# Patient Record
Sex: FEMALE | Race: OTHER
[De-identification: names, ages, dates, MRNs, and addresses within clinical notes are randomized per-mention and may not be internally consistent; named-entity substitution may affect disease eponyms.]

---

## 2017-08-16 ENCOUNTER — HOSPITAL ENCOUNTER (OUTPATIENT)
Dept: HOSPITAL 72 - PAN | Age: 72
Discharge: HOME | End: 2017-08-16
Payer: MEDICARE

## 2017-08-16 DIAGNOSIS — K21.9: Primary | ICD-10-CM

## 2017-08-16 DIAGNOSIS — K62.5: ICD-10-CM

## 2017-08-16 DIAGNOSIS — E78.00: ICD-10-CM

## 2017-08-16 DIAGNOSIS — I10: ICD-10-CM

## 2017-08-16 DIAGNOSIS — R97.0: ICD-10-CM

## 2017-08-16 DIAGNOSIS — Z85.3: ICD-10-CM

## 2017-08-16 DIAGNOSIS — Z90.81: ICD-10-CM

## 2017-08-16 PROCEDURE — 99201: CPT

## 2017-08-16 NOTE — GI INITIAL CONSULT NOTE
Ilene Basilio N.PJaqueline 8/16/17 1519:


History of Present Illness


General


Date patient seen:  Aug 16, 2017


Time patient seen:  14:00


Referring physician:  MARICARMEN


Reason for Consultation:  RECTAL BLEED





Present Illness


HPI


72 year old female patient presents today with c/o of rectal bleed with bloody 

stools, fatigue, and 7 lbs weight loss in the past few months.  In addition the 

patient c/o of abdominal bloating.  Patient has CEA elevation 5.6 during last 

draw x 6 months.  Denies any changes in dietary habits.


Home Meds


Reported Medications


Beclomethasone Dipropionate 40MCG Oral Inh (QVAR 40*) 7.3 Gm Aer.w.adap, 1 PUFF 

INH TWICE A DAY, GM 0 Refills


   8/16/17


Risedronate Sodium (ATELVIA) 35 Mg Tablet.dr, 35 MG ORAL ONCE A WEEK, TAB


   8/16/17


Fluticasone Propionate (Flonase Allergy Relief) 9.9 Ml [place].susp, 9.9 ML NS


   8/16/17


Cholecalciferol (Vitamin D3)* (VITAMIN D*) 1,000 Unit Tablet, 5000 UNIT ORAL 

DAILY, #30 TAB


   8/16/17


Metoprolol Succinate* (TOPROL XL*) 100 Mg Tab.er.24h, 100 MG ORAL DAILY, TAB 0 

Refills


   8/16/17


Atorvastatin Calcium* (LIPITOR*) 10 Mg Tablet, 10 MG ORAL BEDTIME, TAB


   8/16/17


Anastrozole* (ARIMIDEX*) 1 Mg Tablet, 1 MG PO DAILY, TAB


   8/16/17


Lorazepam* (LORAZEPAM*) 0.5 Mg Tablet, 0.5 MG ORAL THREE TIMES A DAY, TAB


   8/16/17


[trintellix]   No Conflict Check, 10


   8/16/17


Dexlansoprazole (Dexilant) 60 Mg Cap.bp, 60 MG ORAL DAILY, CAP


   8/16/17


Celecoxib* (CELEBREX*) 200 Mg Capsule, 200 MG ORAL DAILY, CAP


   8/16/17


Med list reviewed/reconciled:  Yes





Patient History


History Provided By:  Patient


PMH Narrative


HTN


GERD


Depression


Elevated Cholesterol


Breast CA





PSHx


Splenectomy x 20 years


Pertinent Family History:  none


Social History:  Denies: alcohol use, drug use, other, smoking





Review of Systems


All Other Systems:  negative except mentioned in HPI





Physical Exam


T 98.3


/65


P 59


95 RA





.8 lbs


Sp02 EP Interpretation:  reviewed


General Appearance:  normal inspection, well appearing, no apparent distress, 

alert


Head:  normocephalic


EENT:  PERRL/EOMI, normal ENT inspection, TMs normal


Neck:  normal inspection, full range of motion, supple


Respiratory:  normal inspection, chest non-tender, lungs clear, normal breath 

sounds


Cardiovascular:  normal rate


Gastrointestinal:  normal inspection, non tender, soft, normal bowel sounds


Rectal:  normal exam, deferred


Genitourinary:  normal inspection, no CVA tenderness


Musculoskeletal:  normal inspection, back normal


Neurologic:  normal inspection, alert, oriented x3, responsive


Psychiatric:  normal inspection, judgement/insight normal, memory normal


Skin:  normal inspection, normal color, no rash, warm/dry, palpation normal, 

well hydrated, normal turgor


Lymphatic:  normal inspection, no adenopathy





GI: Plan


Problems:  


(1) Elevated CEA


(2) HTN (hypertension)


(3) GERD (gastroesophageal reflux disease)


(4) Elevated cholesterol


(5) Breast CA


(6) Colonoscopy planned


(7) Rectal bleed


Plan


EGD/colonoscopy scheduled 8/23/17.


- CLD and TriLy[employer] prep instructions given.


repeat CEA on day of procedure.





Seen with Dr. Albarran.


Thank you for referring this patient, we will follow.





SURJIT ALBARRAN 8/18/17 1027:


History of Present Illness


Present Illness


Home Meds


Reported Medications


Beclomethasone Dipropionate 40MCG Oral Inh (QVAR 40*) 7.3 Gm Aer.w.adap, 1 PUFF 

INH TWICE A DAY, GM 0 Refills


   8/16/17


Risedronate Sodium (ATELVIA) 35 Mg Tablet.dr, 35 MG ORAL ONCE A WEEK, TAB


   8/16/17


Fluticasone Propionate (Flonase Allergy Relief) 9.9 Ml [place].susp, 9.9 ML NS


   8/16/17


Cholecalciferol (Vitamin D3)* (VITAMIN D*) 1,000 Unit Tablet, 5000 UNIT ORAL 

DAILY, #30 TAB


   8/16/17


Metoprolol Succinate* (TOPROL XL*) 100 Mg Tab.er.24h, 100 MG ORAL DAILY, TAB 0 

Refills


   8/16/17


Atorvastatin Calcium* (LIPITOR*) 10 Mg Tablet, 10 MG ORAL BEDTIME, TAB


   8/16/17


Anastrozole* (ARIMIDEX*) 1 Mg Tablet, 1 MG PO DAILY, TAB


   8/16/17


Lorazepam* (LORAZEPAM*) 0.5 Mg Tablet, 0.5 MG ORAL THREE TIMES A DAY, TAB


   8/16/17


[trintellix]   No Conflict Check, 10


   8/16/17


Dexlansoprazole (Dexilant) 60 Mg Cap.bp, 60 MG ORAL DAILY, CAP


   8/16/17


Celecoxib* (CELEBREX*) 200 Mg Capsule, 200 MG ORAL DAILY, CAP


   8/16/17





GI: Plan


Plan


The patient was seen and examined at bedside and all new and available data was 

reviewed in the patients chart. I agree with the above findings, impression 

and plan.  (Patient seen earlier today. Signature stamp does not reflect 

patient encounter time.). -Ilene Muller MD, N.P. Aug 16, 2017 15:19


SURJIT ALBARRAN Aug 18, 2017 10:27

## 2017-08-23 ENCOUNTER — HOSPITAL ENCOUNTER (OUTPATIENT)
Dept: HOSPITAL 72 - GAS | Age: 72
Discharge: HOME | End: 2017-08-23
Payer: MEDICARE

## 2017-08-23 VITALS — DIASTOLIC BLOOD PRESSURE: 78 MMHG | SYSTOLIC BLOOD PRESSURE: 124 MMHG

## 2017-08-23 VITALS — HEIGHT: 60 IN | BODY MASS INDEX: 28.86 KG/M2 | WEIGHT: 147 LBS

## 2017-08-23 VITALS — SYSTOLIC BLOOD PRESSURE: 125 MMHG | DIASTOLIC BLOOD PRESSURE: 78 MMHG

## 2017-08-23 VITALS — SYSTOLIC BLOOD PRESSURE: 150 MMHG | DIASTOLIC BLOOD PRESSURE: 67 MMHG

## 2017-08-23 VITALS — SYSTOLIC BLOOD PRESSURE: 124 MMHG | DIASTOLIC BLOOD PRESSURE: 78 MMHG

## 2017-08-23 VITALS — DIASTOLIC BLOOD PRESSURE: 66 MMHG | SYSTOLIC BLOOD PRESSURE: 128 MMHG

## 2017-08-23 VITALS — SYSTOLIC BLOOD PRESSURE: 143 MMHG | DIASTOLIC BLOOD PRESSURE: 71 MMHG

## 2017-08-23 VITALS — SYSTOLIC BLOOD PRESSURE: 129 MMHG | DIASTOLIC BLOOD PRESSURE: 63 MMHG

## 2017-08-23 VITALS — SYSTOLIC BLOOD PRESSURE: 148 MMHG | DIASTOLIC BLOOD PRESSURE: 73 MMHG

## 2017-08-23 VITALS — SYSTOLIC BLOOD PRESSURE: 141 MMHG | DIASTOLIC BLOOD PRESSURE: 55 MMHG

## 2017-08-23 DIAGNOSIS — D12.4: ICD-10-CM

## 2017-08-23 DIAGNOSIS — D12.0: ICD-10-CM

## 2017-08-23 DIAGNOSIS — D12.3: ICD-10-CM

## 2017-08-23 DIAGNOSIS — R00.1: ICD-10-CM

## 2017-08-23 DIAGNOSIS — Z79.899: ICD-10-CM

## 2017-08-23 DIAGNOSIS — K64.8: ICD-10-CM

## 2017-08-23 DIAGNOSIS — K29.70: ICD-10-CM

## 2017-08-23 DIAGNOSIS — D12.5: ICD-10-CM

## 2017-08-23 DIAGNOSIS — D12.7: ICD-10-CM

## 2017-08-23 DIAGNOSIS — I10: ICD-10-CM

## 2017-08-23 DIAGNOSIS — R97.0: Primary | ICD-10-CM

## 2017-08-23 DIAGNOSIS — E11.9: ICD-10-CM

## 2017-08-23 LAB
ALBUMIN/GLOB SERPL: 1.4 {RATIO} (ref 1–2.7)
ALT SERPL-CCNC: 15 U/L (ref 3–33)
ANION GAP SERPL CALC-SCNC: 10 MMOL/L (ref 5–15)
AST SERPL-CCNC: 19 U/L (ref 5–40)
BASOPHILS NFR BLD AUTO: 1.5 % (ref 0–2)
CALCIUM SERPL-MCNC: 8.4 MG/DL (ref 8.6–10.2)
CHLORIDE SERPL-SCNC: 106 MEQ/L (ref 98–107)
CO2 SERPL-SCNC: 26 MEQ/L (ref 20–30)
CREAT SERPL-MCNC: 0.7 MG/DL (ref 0.5–0.9)
EOSINOPHIL NFR BLD AUTO: 1.9 % (ref 0–3)
ERYTHROCYTE [DISTWIDTH] IN BLOOD BY AUTOMATED COUNT: 11.1 % (ref 11.6–14.8)
GLOBULIN SER-MCNC: 2.4 G/DL
HEMOLYSIS: 5
LYMPHOCYTES NFR BLD AUTO: 44.3 % (ref 20–45)
MCH RBC QN AUTO: 32.4 PG (ref 27–31)
MCHC RBC AUTO-ENTMCNC: 33.1 G/DL (ref 32–36)
MCV RBC AUTO: 98 FL (ref 80–99)
MONOCYTES NFR BLD AUTO: 6.6 % (ref 1–10)
NEUTROPHILS NFR BLD AUTO: 45.7 % (ref 45–75)
PLATELET # BLD: 247 K/UL (ref 150–450)
PMV BLD AUTO: 8.9 FL (ref 6.5–10.1)
POTASSIUM SERPL-SCNC: 3.9 MEQ/L (ref 3.4–4.9)
PROT SERPL-MCNC: 5.9 G/DL (ref 6.6–8.7)
RBC # BLD AUTO: 3.77 M/UL (ref 4.2–5.4)
SODIUM SERPL-SCNC: 142 MEQ/L (ref 135–145)
WBC # BLD AUTO: 9.2 K/UL (ref 4.8–10.8)

## 2017-08-23 PROCEDURE — 93005 ELECTROCARDIOGRAM TRACING: CPT

## 2017-08-23 PROCEDURE — 43239 EGD BIOPSY SINGLE/MULTIPLE: CPT

## 2017-08-23 PROCEDURE — 82378 CARCINOEMBRYONIC ANTIGEN: CPT

## 2017-08-23 PROCEDURE — 45381 COLONOSCOPY SUBMUCOUS NJX: CPT

## 2017-08-23 PROCEDURE — 94003 VENT MGMT INPAT SUBQ DAY: CPT

## 2017-08-23 PROCEDURE — 94150 VITAL CAPACITY TEST: CPT

## 2017-08-23 PROCEDURE — 36415 COLL VENOUS BLD VENIPUNCTURE: CPT

## 2017-08-23 PROCEDURE — 85025 COMPLETE CBC W/AUTO DIFF WBC: CPT

## 2017-08-23 PROCEDURE — 45385 COLONOSCOPY W/LESION REMOVAL: CPT

## 2017-08-23 PROCEDURE — 80053 COMPREHEN METABOLIC PANEL: CPT

## 2017-08-23 PROCEDURE — 45380 COLONOSCOPY AND BIOPSY: CPT

## 2017-08-23 NOTE — 48 HOUR POST ANESTHESIA EVAL
Post Anesthesia Evaluation


Procedure:  egd/colonoscopy


Date of Evaluation:  Aug 23, 2017


Time of Evaluation:  13:46


Blood Pressure Systolic:  125


0:  78


Pulse Rate:  56


Respiratory Rate:  18


Temperature (Fahrenheit):  97.2


O2 Sat by Pulse Oximetry:  100


Airway:  patent


Nausea:  No


Vomiting:  No


Pain Intensity:  0


Hydration Status:  adequate


Cardiopulmonary Status:


stable


Mental Status/LOC:  patient returned to baseline


Post-Anesthesia Complications:


none


Follow-up care needed:  N/A











COLETTE DAI Aug 23, 2017 13:47

## 2017-08-23 NOTE — IMMEDIATE POST-OP EVALUATION
Immediate Post-Op Evalulation


Immediate Post-Op Evalulation


Procedure:  egd/colonoscopy


Date of Evaluation:  Aug 23, 2017


Time of Evaluation:  12:47


IV Fluids:  0.9ns 650ml


Blood Products:  none


Estimated Blood Loss:  negligible


Blood Pressure Systolic:  124


Blood Pressure Diastolic:  78


Pulse Rate:  57


Respiratory Rate:  18


O2 Sat by Pulse Oximetry:  100


Temperature (Fahrenheit):  97.2


Pain Score (1-10):  0


Nausea:  No


Vomiting:  No


Complications


none


Patient Status:  awake, reacts, patent


Hydration Status:  adequate


Drug:  COLETTE Dumont Aug 23, 2017 13:46

## 2017-08-23 NOTE — ENDOSCOPY PROCEDURE NOTE
Endoscopy Procedure Note


Indication for Procedure:  elevated CEA


Procedures Performed:  EGD, colonoscopy


Operative Findings/Diagnosis:  gastritis, hemorrhoids


Specimen:  yes


Pt Tolerated Procedure Well:  Yes


Estimated Blood Loss:  none


Anesthesiologist:  catie


Anesthesia:  MAC


Implant(s) used?:  No


50 yrs or older w/o bx or poly:  No


10yrs. F/U not recommended:  Yes


If not recommended, why?:  Above average risk


10 yrs. F/U needed:  Yes


18 years or older w/prev. colo:  No











SURJIT CABA Aug 23, 2017 11:33

## 2017-08-23 NOTE — SHORT STAY SURGERY H&P
History of Present Illness


History of Present Illness


Chief Complaint


see recent consult note


HPI


Jagjit Lovell is a 72 year old female who was admitted on  for Abnormal Cea





Patient History


Allergies:  


Coded Allergies:  


     No Known Allergies (Unverified , 8/22/17)


PAST MEDICAL HISTORY:  


Past Surgeries:  


Social History:  





Medication History


Scheduled


Anastrozole* (Arimidex*), 1 MG PO DAILY, (Reported)


Atorvastatin Calcium* (Lipitor*), 10 MG ORAL BEDTIME, (Reported)


Celecoxib* (Celebrex*), 200 MG ORAL DAILY, (Reported)


Cholecalciferol (Vitamin D3)* (Vitamin D*), 5,000 UNIT ORAL DAILY, (Reported)


Metoprolol Succinate* (Toprol Xl*), 100 MG ORAL DAILY, (Reported)





Discontinued Medications


Beclomethasone Dipropionate 40MCG Oral Inh (Qvar 40*), 1 PUFF INH TWICE A DAY, (

Reported)


   Discontinued Reason: Pt stopped taking med


Dexlansoprazole (Dexilant), 60 MG ORAL DAILY, (Reported)


   Discontinued Reason: Pt stopped taking med


Fluticasone Propionate (Flonase Allergy Relief), 9.9 ML NS, (Reported)


   Discontinued Reason: Pt stopped taking med


Lorazepam* (Lorazepam*), 0.5 MG ORAL THREE TIMES A DAY, (Reported)


   Discontinued Reason: Pt stopped taking med


Risedronate Sodium (Atelvia), 35 MG ORAL ONCE A WEEK, (Reported)


   Discontinued Reason: Pt stopped taking med


[trintellix], 10, (Reported)


   Discontinued Reason: Pt stopped taking med





Physical Exam


Vital Signs





Last Vital Signs








  Date Time  Temp Pulse Resp B/P (MAP) Pulse Ox O2 Delivery O2 Flow Rate FiO2


 


8/23/17 09:55 98.4 62 18 143/71 98 Room Air  











Plan


Attestation


Are the patient's medical conditions optimized for surgery?











SURJIT CABA Aug 23, 2017 10:20

## 2017-08-23 NOTE — PRE-PROCEDURE NOTE/ATTESTATION
Pre-Procedure Note/Attestation


Complete Prior to Procedure


Planned Procedure:  not applicable


Procedure Narrative:


esophagogastroduodenoscopy and colonoscopy





Indications for Procedure


Pre-Operative Diagnosis:


elevated CEA, screening colon





Attestation


I attest that I discussed the nature of the procedure; its benefits; risks and 

complications; and alternatives (and the risks and benefits of such alternatives

), prior to the procedure, with the patient (or the patient's legal 

representative).





I attest that, if there was a reasonable possibility of needing a blood 

transfusion, the patient (or the patient's legal representative) was given the 

[facility] standardized written summary, pursuant 

to the [place] Blood Safety Act ([place] Health and Safety Code # 1645, as 

amended).





I attest that I re-evaluated the patient just prior to the surgery and that 

there has been no change in the patient's H&P, except as documented below:











SURJIT CABA Aug 23, 2017 10:20

## 2017-08-23 NOTE — ANETHESIA PREOPERATIVE EVAL
Anesthesia Pre-op PMH/ROS


General


Date of Evaluation:  Aug 23, 2017


Time of Evaluation:  06:01


Anesthesiologist:  milla


ASA Score:  ASA 3


Mallampati Score


Class I : Soft palate, uvula, fauces, pillars visible


Class II: Soft palate, uvula, fauces visible


Class III: Soft palate, base of uvula visible


Class IV: Only hard plate visible


Mallampati Classification:  Class II


Surgeon:  delisa


Diagnosis:  abnormal CEA


Surgical Procedure:  egd/colonoscopy


Anesthesia History:  none


Family History:  no anesthesia problems


Allergies:  


Coded Allergies:  


     No Known Allergies (Unverified , 8/22/17)


Medications:  see eMAR





Past Medical History


Cardiovascular:  Reports: HTN


Endocrine:  Reports: DM





Anesthesia Pre-op Phys. Exam


Physician Exam





Last Vital Signs








  Date Time  Temp Pulse Resp B/P (MAP) Pulse Ox O2 Delivery O2 Flow Rate FiO2


 


8/23/17 09:55 98.4 62 18 143/71 98 Room Air  








Constitutional:  NAD


Neurologic:  CN 2-12 intact


Cardiovascular:  RRR


Respiratory:  CTA


Gastrointestinal:  S/NT/ND





Airway Exam


Mallampati Score:  Class II


MO:  full


Neck:  supple


TMD:  2fb


ROM:  full


Teeth:  missing





Anesthesia Pre-op A/P


Studies


Pre-op Studies:  EKG - sinus bradycardia, nssttw abnl





Risk Assessment & Plan


Assessment:


asa3


Plan:


mac


Status Change Before Surgery:  No





Pre-Antibiotics


Drug:  COLETTE Dumont Aug 23, 2017 06:02

## 2017-08-24 NOTE — PROCEDURE NOTE
DATE OF PROCEDURE:  08/23/2017



SURGEON:  Atilio Albarran M.D.



PROCEDURE:  Upper endoscopy with biopsy and colonoscopy with biopsy

and snare polypectomy.



ANESTHESIOLOGIST:  Karely Liao M.D.



INSTRUMENT:  Olympus adult flexible upper endoscope and

colonoscope.



INDICATION:  Elevated CEA.



REASON FOR PROCEDURE:  The procedure, risks, benefits, and possible

consequences, including hemorrhage, aspiration, perforation and infection,

and alternative treatments, were explained to the patient/legal guardian

by Dr. Atilio Albarran and the patient/legal guardian understood and

accepted these risks.



DESCRIPTION OF PROCEDURE:  After informed consent was obtained and

the patient was adequately sedated, Olympus upper endoscope was advanced

from mouth into the second portion of duodenum and retroflexion was

performed in the stomach.



The patient had diffuse gastritis.  Random biopsy from antrum of the

stomach was obtained to rule out H. pylori infection.  Otherwise, the rest

of the upper endoscopic examination grossly within normal limits.  At this

time, the upper endoscope was retrieved and the patient was turned over

for colonoscopy.



First, a rectal exam was performed, which was positive for internal

hemorrhoids.  Then, the scope was advanced from the rectum into the cecum,

documented by appendiceal orifice, ileocecal valve, and right upper

quadrant palpation.  Quality of prep was adequate.



This was a very challenging and prolonged procedure.  The patient had 15

polyps that we took out and I am pretty sure there are more polyps in this

colonoscopy examination.  We tried to take the big ones out.  The patient

had one in the cecum, which was removed with the cold biopsy forceps

technique, two in the ascending colon, 7 in the transverse colon, two in

the descending colon, two in the sigmoid colon, and one very large one in

the rectosigmoid area.  Most of these polyps were large, some of them were

over 1 cm, and we had to actually cut the polyps in half or even in thirds

to be able to be pass them through the scope.  Then, there is a very large

area of the leaking polyp in the rectosigmoid area about 20 cm from the

anal verge.  This polyp was over 3 to 4 cm in length and very villous

looking, difficult to completely resect one,   _____ polyp.  Given this

procedure was already significantly prolonged, we decided to remove some

of the pieces of this polyp.  We also tattooed the most distal portion of

this polyp with a tattoo for future references.  Retroflexion of rectum

showed evidence of internal hemorrhoids.



SUMMARY OF FINDINGS:  Numerous colonic polyps, see above for

details.



PLAN:  Followup pathology.  _____ or adenocarcinoma in situ to it or

any high-grade dysplasia, we will recommend repeat colonoscopy or

resection.  If there is any evidence of cancer in this polyp, I will

closely recommend resection.  I had a long discussion with the daughter of

the patient after the procedure, given them option.  We are waiting for

the biopsy to come back.  Again if the biopsies were consistent with

cancer, eventually the patient needs surgery.  Otherwise, options would be

surgery for complete resection versus multiple colonoscopies with

piecemeal removal of this polyp.



I want to thank, Dr. Atilio Stone, for this kind referral.









  ______________________________________________

  Atilio Albarran M.D.





DR:  STEFANO

D:  08/23/2017 13:42

T:  08/24/2017 00:28

JOB#:  [id number]

CC:  Atilio Stone M.D.; Fax#:  [phone]

## 2017-08-24 NOTE — CARDIOLOGY REPORT
--------------- APPROVED REPORT --------------





EKG Measurement

Heart [id number]

IN [id number]

[id number]

[id number]

[id number]





Sinus bradycardia

Moderate voltage criteria for LVH, may be normal variant

Nonspecific ST and T wave abnormality

Abnormal ECG

## 2019-01-14 ENCOUNTER — HOSPITAL ENCOUNTER (OUTPATIENT)
Dept: HOSPITAL 72 - PAN | Age: 74
Discharge: HOME | End: 2019-01-14
Payer: MEDICARE

## 2019-01-14 DIAGNOSIS — Z85.3: ICD-10-CM

## 2019-01-14 DIAGNOSIS — R10.9: Primary | ICD-10-CM

## 2019-01-14 DIAGNOSIS — R63.4: ICD-10-CM

## 2019-01-14 DIAGNOSIS — R97.0: ICD-10-CM

## 2019-01-14 DIAGNOSIS — I10: ICD-10-CM

## 2019-01-14 DIAGNOSIS — F32.9: ICD-10-CM

## 2019-01-14 DIAGNOSIS — K21.9: ICD-10-CM

## 2019-01-14 DIAGNOSIS — D12.6: ICD-10-CM

## 2019-01-14 PROCEDURE — 99212 OFFICE O/P EST SF 10 MIN: CPT

## 2019-01-15 VITALS — DIASTOLIC BLOOD PRESSURE: 61 MMHG | SYSTOLIC BLOOD PRESSURE: 108 MMHG

## 2019-01-15 NOTE — GI INITIAL CONSULT NOTE
History of Present Illness


General


Date patient seen:  Kristofer 15, 2019


Time patient seen:  15:23


Referring physician:  MARICARMEN


Reason for Consultation:  Abdominal pain





Present Illness


HPI


73-year-old patient who presents today with left-sided abdominal pain complaint 

of weight loss.  The patient had approximate weight loss of 15 pounds within 

the past 6 months.  The patient had a EGD colonoscopy performed back in 2017, 

noted with numerous amounts of colonic polyps.  Pathology reported sessile 

serrated adenoma, bilious adenoma with focal high-grade dysplasia throughout 

the colon.   No changes in dietary habits.  No signs of abuse or neglect.  

Patient is not fall risk.


Home Meds


Reported Medications


Cholecalciferol (Vitamin D3)* (VITAMIN D*) 1,000 Unit Tablet, 5000 UNIT ORAL 

DAILY, #30 TAB


   8/16/17


Metoprolol Succinate* (TOPROL XL*) 100 Mg Tab.er.24h, 100 MG ORAL DAILY, TAB 0 

Refills


   8/16/17


Atorvastatin Calcium* (LIPITOR*) 10 Mg Tablet, 10 MG ORAL BEDTIME, TAB


   8/16/17


Anastrozole* (ARIMIDEX*) 1 Mg Tablet, 1 MG PO DAILY, TAB


   8/16/17


Celecoxib* (CELEBREX*) 200 Mg Capsule, 200 MG ORAL DAILY, CAP


   8/16/17


Med list reviewed/reconciled:  Yes


Allergies:  


Coded Allergies:  


     No Known Allergies (Unverified , 8/22/17)





Patient History


History Provided By:  Patient, Medical Record


PMH Narrative


HTN


GERD


Depression


Elevated Cholesterol


Breast CA





PSHx


Splenectomy x 20 years


Pertinent Family History:  none


Social History:  Denies: smoking, alcohol use, drug use, other





Review of Systems


All Other Systems:  negative except mentioned in HPI





Physical Exam





Vital Signs








  Date Time  Temp Pulse Resp B/P (MAP) Pulse Ox O2 Delivery O2 Flow Rate FiO2


 


1/15/19 13:32 98.2 68  108/61 97   








Sp02 EP Interpretation:  reviewed, normal


General Appearance:  well appearing, no apparent distress, alert


Head:  normocephalic


EENT:  PERRL/EOMI, normal ENT inspection


Neck:  supple


Respiratory:  normal breath sounds, no respiratory distress


Cardiovascular:  normal rate


Gastrointestinal:  normal inspection, non tender, soft, normal bowel sounds, non

-distended


Rectal:  deferred


Genitourinary:  no CVA tenderness


Musculoskeletal:  normal inspection, back normal


Neurologic:  normal inspection, alert, oriented x3, responsive


Psychiatric:  normal inspection, judgement/insight normal, memory normal


Skin:  normal inspection, normal color, no rash, warm/dry, palpation normal, 

well hydrated


Lymphatic:  normal inspection, no adenopathy





GI: Plan


Problems:  


(1) Weight loss


(2) Colonoscopy planned


(3) Elevated CEA


(4) GERD (gastroesophageal reflux disease)


Plan


EGD/colonoscopy to be scheduled January 16, 2019.


- CLD & (Nulytely/Suprep/Movi-Prep) prep instructions given and acknowledged by 

patient.


- NPO @ MN day prior procedure explained.


We will also draw labs and perform a pan CT procedure day


Will follow with additional recs post procedure.





Seen with Dr. Albarran.


Thank you for this patient referral.





The patient was seen and examined at bedside and all new and available data was 

reviewed in the patients chart. I agree with the above findings, impression 

and plan.  (Patient seen earlier today. Signature stamp does not reflect 

patient encounter time.). - MD Chetna GomesPhoenix Children's HospitalWilberto NP Kristofer 15, 2019 15:29

## 2019-01-16 ENCOUNTER — HOSPITAL ENCOUNTER (OUTPATIENT)
Dept: HOSPITAL 72 - GAS | Age: 74
Discharge: HOME | End: 2019-01-16
Payer: MEDICARE

## 2019-01-16 VITALS — SYSTOLIC BLOOD PRESSURE: 126 MMHG | DIASTOLIC BLOOD PRESSURE: 61 MMHG

## 2019-01-16 VITALS — HEIGHT: 60 IN | BODY MASS INDEX: 25.32 KG/M2 | WEIGHT: 129 LBS

## 2019-01-16 VITALS — SYSTOLIC BLOOD PRESSURE: 117 MMHG | DIASTOLIC BLOOD PRESSURE: 70 MMHG

## 2019-01-16 VITALS — DIASTOLIC BLOOD PRESSURE: 58 MMHG | SYSTOLIC BLOOD PRESSURE: 116 MMHG

## 2019-01-16 VITALS — DIASTOLIC BLOOD PRESSURE: 65 MMHG | SYSTOLIC BLOOD PRESSURE: 115 MMHG

## 2019-01-16 VITALS — SYSTOLIC BLOOD PRESSURE: 117 MMHG | DIASTOLIC BLOOD PRESSURE: 60 MMHG

## 2019-01-16 VITALS — SYSTOLIC BLOOD PRESSURE: 115 MMHG | DIASTOLIC BLOOD PRESSURE: 59 MMHG

## 2019-01-16 VITALS — DIASTOLIC BLOOD PRESSURE: 56 MMHG | SYSTOLIC BLOOD PRESSURE: 115 MMHG

## 2019-01-16 VITALS — SYSTOLIC BLOOD PRESSURE: 141 MMHG | DIASTOLIC BLOOD PRESSURE: 57 MMHG

## 2019-01-16 VITALS — DIASTOLIC BLOOD PRESSURE: 59 MMHG | SYSTOLIC BLOOD PRESSURE: 126 MMHG

## 2019-01-16 DIAGNOSIS — R63.4: ICD-10-CM

## 2019-01-16 DIAGNOSIS — F32.9: ICD-10-CM

## 2019-01-16 DIAGNOSIS — D12.3: ICD-10-CM

## 2019-01-16 DIAGNOSIS — E78.5: ICD-10-CM

## 2019-01-16 DIAGNOSIS — D12.7: ICD-10-CM

## 2019-01-16 DIAGNOSIS — K64.8: ICD-10-CM

## 2019-01-16 DIAGNOSIS — I10: ICD-10-CM

## 2019-01-16 DIAGNOSIS — Z86.010: ICD-10-CM

## 2019-01-16 DIAGNOSIS — Z85.3: ICD-10-CM

## 2019-01-16 DIAGNOSIS — G47.33: ICD-10-CM

## 2019-01-16 DIAGNOSIS — K29.50: Primary | ICD-10-CM

## 2019-01-16 DIAGNOSIS — J44.9: ICD-10-CM

## 2019-01-16 DIAGNOSIS — Z90.81: ICD-10-CM

## 2019-01-16 DIAGNOSIS — K63.5: ICD-10-CM

## 2019-01-16 DIAGNOSIS — K57.30: ICD-10-CM

## 2019-01-16 DIAGNOSIS — M19.90: ICD-10-CM

## 2019-01-16 DIAGNOSIS — F41.9: ICD-10-CM

## 2019-01-16 DIAGNOSIS — K21.9: ICD-10-CM

## 2019-01-16 LAB
ADD MANUAL DIFF: NO
ALBUMIN SERPL-MCNC: 2.3 G/DL (ref 3.4–5)
ALBUMIN/GLOB SERPL: 0.5 {RATIO} (ref 1–2.7)
ALP SERPL-CCNC: 61 U/L (ref 46–116)
ALT SERPL-CCNC: 35 U/L (ref 12–78)
ANION GAP SERPL CALC-SCNC: 12 MMOL/L (ref 5–15)
AST SERPL-CCNC: 21 U/L (ref 15–37)
BASOPHILS NFR BLD AUTO: 0.7 % (ref 0–2)
BILIRUB SERPL-MCNC: 0.7 MG/DL (ref 0.2–1)
BUN SERPL-MCNC: 20 MG/DL (ref 7–18)
CALCIUM SERPL-MCNC: 9.3 MG/DL (ref 8.5–10.1)
CHLORIDE SERPL-SCNC: 107 MMOL/L (ref 98–107)
CO2 SERPL-SCNC: 23 MMOL/L (ref 21–32)
CREAT SERPL-MCNC: 1.2 MG/DL (ref 0.55–1.3)
EOSINOPHIL NFR BLD AUTO: 0.4 % (ref 0–3)
ERYTHROCYTE [DISTWIDTH] IN BLOOD BY AUTOMATED COUNT: 12.4 % (ref 11.6–14.8)
GLOBULIN SER-MCNC: 4.8 G/DL
HCT VFR BLD CALC: 29.4 % (ref 37–47)
HGB BLD-MCNC: 9.4 G/DL (ref 12–16)
LYMPHOCYTES NFR BLD AUTO: 31.2 % (ref 20–45)
MCV RBC AUTO: 91 FL (ref 80–99)
MONOCYTES NFR BLD AUTO: 10.6 % (ref 1–10)
NEUTROPHILS NFR BLD AUTO: 57.1 % (ref 45–75)
PLATELET # BLD: 402 K/UL (ref 150–450)
POTASSIUM SERPL-SCNC: 3.8 MMOL/L (ref 3.5–5.1)
RBC # BLD AUTO: 3.22 M/UL (ref 4.2–5.4)
SODIUM SERPL-SCNC: 142 MMOL/L (ref 136–145)
WBC # BLD AUTO: 11.9 K/UL (ref 4.8–10.8)

## 2019-01-16 PROCEDURE — 94150 VITAL CAPACITY TEST: CPT

## 2019-01-16 PROCEDURE — 45380 COLONOSCOPY AND BIOPSY: CPT

## 2019-01-16 PROCEDURE — 45385 COLONOSCOPY W/LESION REMOVAL: CPT

## 2019-01-16 PROCEDURE — 85025 COMPLETE CBC W/AUTO DIFF WBC: CPT

## 2019-01-16 PROCEDURE — 94003 VENT MGMT INPAT SUBQ DAY: CPT

## 2019-01-16 PROCEDURE — 82378 CARCINOEMBRYONIC ANTIGEN: CPT

## 2019-01-16 PROCEDURE — 36415 COLL VENOUS BLD VENIPUNCTURE: CPT

## 2019-01-16 PROCEDURE — 80053 COMPREHEN METABOLIC PANEL: CPT

## 2019-01-16 PROCEDURE — 93005 ELECTROCARDIOGRAM TRACING: CPT

## 2019-01-16 PROCEDURE — 43239 EGD BIOPSY SINGLE/MULTIPLE: CPT

## 2019-01-16 PROCEDURE — 45381 COLONOSCOPY SUBMUCOUS NJX: CPT

## 2019-01-16 NOTE — ANETHESIA PREOPERATIVE EVAL
Anesthesia Pre-op PMH/ROS


General


Date of Evaluation:  Jan 16, 2019


Time of Evaluation:  10:50


Anesthesiologist:  John


ASA Score:  ASA 2


Mallampati Score


Class I : Soft palate, uvula, fauces, pillars visible


Class II: Soft palate, uvula, fauces visible


Class III: Soft palate, base of uvula visible


Class IV: Only hard plate visible


Mallampati Classification:  Class II


Surgeon:  Avis


Diagnosis:  colon polyps


Surgical Procedure:  EGD/Colonoscopy


Anesthesia History:  none


Family History:  no anesthesia problems


Allergies:  


Coded Allergies:  


     No Known Allergies (Unverified , 1/16/19)


Medications:  see eMAR


Patient NPO?:  Yes


NPO Date:  Kristofer 15, 2019


NPO Time:  21:00





Past Medical History


Cardiovascular:  Reports: HTN, other - high lipids


Pulmonary:  Reports: asthma, COPD, JA, other


Gastrointestinal/Genitourinary:  Reports: GERD, other - splenoctomy 1998


Neurologic/Psychiatric:  Reports: depression/anxiety


Endocrine:  Denies: DM, hypothyroidism, steroids, other


HEENT:  Reports: cataract (L), cataract (R)


Hematology/Immune:  Reports: other - right breast CA - lumpectomy done


Musculoskeletal/Integumentary:  Reports: OA, DJD





Anesthesia Pre-op Phys. Exam


Physician Exam





Last Vital Signs








  Date Time  Temp Pulse Resp B/P (MAP) Pulse Ox O2 Delivery O2 Flow Rate FiO2


 


1/16/19 10:40 97.8 18 18 126/59 97 Room Air  








Constitutional:  NAD


Neurologic:  CN 2-12 intact


Cardiovascular:  RRR


Respiratory:  CTA


Gastrointestinal:  S/NT/ND





Airway Exam


Mallampati Score:  Class II


MO:  full


ROM:  full


Teeth:  intact


Dentures:  no upper, no lower





Anesthesia Pre-op A/P


Labs





Hematology








Test


  1/16/19


10:40


 


White Blood Count


  11.9 K/UL


(4.8-10.8)  H


 


Red Blood Count


  3.22 M/UL


(4.20-5.40)  L


 


Hemoglobin


  9.4 G/DL


(12.0-16.0)  L


 


Hematocrit


  29.4 %


(37.0-47.0)  L


 


Mean Corpuscular Volume 91 FL (80-99)  


 


Mean Corpuscular Hemoglobin


  29.3 PG


(27.0-31.0)


 


Mean Corpuscular Hemoglobin


Concent 32.1 G/DL


(32.0-36.0)


 


Red Cell Distribution Width


  12.4 %


(11.6-14.8)


 


Platelet Count


  402 K/UL


(150-450)


 


Mean Platelet Volume


  7.6 FL


(6.5-10.1)


 


Neutrophils (%) (Auto)


  57.1 %


(45.0-75.0)


 


Lymphocytes (%) (Auto)


  31.2 %


(20.0-45.0)


 


Monocytes (%) (Auto)


  10.6 %


(1.0-10.0)  H


 


Eosinophils (%) (Auto)


  0.4 %


(0.0-3.0)


 


Basophils (%) (Auto)


  0.7 %


(0.0-2.0)








Chemistry








Test


  1/16/19


10:40


 


Sodium Level


  142 MMOL/L


(136-145)


 


Potassium Level


  3.8 MMOL/L


(3.5-5.1)


 


Chloride Level


  107 MMOL/L


()


 


Carbon Dioxide Level


  23 MMOL/L


(21-32)


 


Anion Gap


  12 mmol/L


(5-15)


 


Blood Urea Nitrogen


  20 mg/dL


(7-18)  H


 


Creatinine


  1.2 MG/DL


(0.55-1.30)


 


Estimat Glomerular Filtration


Rate  mL/min (>60)  


 


 


Glucose Level


  77 MG/DL


()


 


Calcium Level


  9.3 MG/DL


(8.5-10.1)


 


Total Bilirubin


  0.7 MG/DL


(0.2-1.0)


 


Aspartate Amino Transf


(AST/SGOT) 21 U/L (15-37)


 


 


Alanine Aminotransferase


(ALT/SGPT) 35 U/L (12-78)


 


 


Alkaline Phosphatase


  61 U/L


()


 


Total Protein


  7.1 G/DL


(6.4-8.2)


 


Albumin


  2.3 G/DL


(3.4-5.0)  L


 


Globulin 4.8 g/dL  


 


Albumin/Globulin Ratio


  0.5 (1.0-2.7)


L


 


Carcinoembryonic Antigen Pending  











Studies


Pre-op Studies:  EKG - NSB 58 bpm





Risk Assessment & Plan


Assessment:


A&Ox4


Plan:


MAC


Status Change Before Surgery:  No





Pre-Antibiotics


Given Within 1 Hr of Incision:  No











Pretty Lopez CRNA Jan 16, 2019 11:26

## 2019-01-16 NOTE — 48 HOUR POST ANESTHESIA EVAL
Post Anesthesia Evaluation


Procedure:  EGD/Colonoscopy


Date of Evaluation:  Jan 16, 2019


Time of Evaluation:  13:31


Blood Pressure Systolic:  117


0:  70


Pulse Rate:  65


Respiratory Rate:  15


Temperature (Fahrenheit):  97.9


O2 Sat by Pulse Oximetry:  100


Airway:  patent


Nausea:  No


Vomiting:  No


Pain Intensity:  0


Hydration Status:  adequate


Cardiopulmonary Status:


WNL


Mental Status/LOC:  patient returned to baseline


Follow-up care needed:  patient intructions given











Pretty Lopez CRNA Jan 16, 2019 11:29

## 2019-01-16 NOTE — PROCEDURE NOTE
DATE OF PROCEDURE:  01/16/2019

SURGEON:  Atilio Albarran M.D.



PROCEDURE:  Colonoscopy with biopsy and tattooing and snare polypectomy and

endoscopy with biopsy.



ANESTHESIA:  Per CRNA ______.



INSTRUMENT:  Olympus adult flexible upper endoscope and colonoscope



INDICATION:  Abdominal pain, weight loss, and history of colonic polyps.



REASON FOR PROCEDURE:  The procedure, risks, benefits, and possible

consequences, including hemorrhage, aspiration, perforation and infection,

and alternative treatments, were explained to the patient/legal guardian

by Dr. Atilio Albarran and the patient/legal guardian understood and

accepted these risks.



PROCEDURE IN DETAIL:  After informed consent was obtained and the patient

was adequately sedated, Olympus upper endoscope was advanced from the

mouth into the second portion of the duodenum and retroflexion was

performed in the stomach.



The patient had diffuse gastritis.  Random biopsy from antrum was

obtained to rule out H. pylori infection.  Otherwise, the rest of the

examination grossly within normal limits.  At this time, the upper

endoscope was retrieved.  The patient was turned over for colonoscopy.



First, rectal exam was performed, which was positive for internal

hemorrhoids.  Then, the scope was the rectum into the cecum documented by

appendiceal orifice, ileocecal valve, and right upper quadrant palpation.

Quality of prep was very poor.



The patient had two diminutive polyps in the transverse colon, removed

with the cold biopsy forceps technique.  There was a sessile polyp

measured roughly about 6 mm in the sigmoid, which was removed with the hot

snare polypectomy technique.



The patient had a large polyp/mass at about 14 cm from the anal verge

expanding all the way to about maybe about 19 cm.  This polyp that we saw

in last colonoscopy and we wanted the patient to have it surgically

removed.  This polyp now is turning to become larger and possibly

transform into malignancy.  It is not possible to remove this polyp

endoscopically.  We biopsied the polyp and also tattooed the most distal

part for future surgeries.



The patient also had some scattered diverticulosis in the left colon.



The patient also had evidence of internal hemorrhoids seen on

retroflexion in the rectum.



SUMMARY OF FINDINGS:

1. Gastritis, status post biopsy.

2. Poor colonic prep, so making the examination limited.

3. Two polyps in the transverse colon, one in the sigmoid.

4. One polyp in the rectosigmoid area at about 14 cm from the anal

verge, very large polyp, unresectable endoscopically, possibly already

turned into malignancy status post biopsy and tattooing.

5. Diverticulosis.

6. Internal hemorrhoids



RECOMMENDATIONS:

1. Follow up biopsy results.

2. The patient will get a CT of the abdomen and pelvis today to rule out

metastasize.  We also going to order CEA level today.  The patient is to

be seen by a surgeon for possible partial colectomy.









  ______________________________________________

  Atilio Albarran M.D.





DR:  SILVINO

D:  01/16/2019 11:47

T:  01/16/2019 14:49

JOB#:  [id number]

CC:

## 2019-01-16 NOTE — ENDOSCOPY PROCEDURE NOTE
Endoscopy Procedure Note


General


Indication for Procedure:  colon polyp, wt loss


Procedures Performed:  EGD, colonoscopy


Operative Findings/Diagnosis:  large colon polyp


Specimen:  yes


Pt Tolerated Procedure Well:  Yes


Estimated Blood Loss:  none





Anesthesia


Anesthesiologist:  lena


Anesthesia:  MAC





Inserted Devices


Implant(s) used?:  No





Quality


Quality of Bowel Preparation:  Poor


Did scope reach the cecum?:  Yes


Was there any complications?:  No





GI Core Measures


50 yrs or older w/o bx or poly:  No


10yrs. F/U not recommended:  Yes


If not recommended, why?:  Above average risk


10 yrs. F/U needed:  Yes


18 years or older w/prev. colo:  Yes


<3yrs. since last colonoscopy:  Yes


Med reason:<3 yrs.:  Piecemeal removal-Adenoma











Atilio Albarran MD Jan 16, 2019 11:49

## 2019-01-16 NOTE — IMMEDIATE POST-OP EVALUATION
Immediate Post-Op Evalulation


Immediate Post-Op Evalulation


Procedure:  EGD/Colonoscopy


Date of Evaluation:  Jan 16, 2019


Time of Evaluation:  11:28


IV Fluids:   ml


Blood Products:  0


Estimated Blood Loss:  0


Urinary Output:  0


Blood Pressure Systolic:  141


Blood Pressure Diastolic:  57


Pulse Rate:  61


Respiratory Rate:  20


O2 Sat by Pulse Oximetry:  99


Temperature (Fahrenheit):  98.1


Pain Score (1-10):  0


Nausea:  No


Vomiting:  No


Complications


none noted


Patient Status:  awake, reacts


Hydration Status:  adequate


Given Within 1 Hr of Incision:  No - none per surgeon











Pretty Lopez CRNA Jan 16, 2019 11:29

## 2019-01-18 NOTE — CARDIOLOGY REPORT
--------------- APPROVED REPORT --------------





EKG Measurement

Heart [id number]

SC [id number]

[id number]

[id number]

[id number]





Sinus bradycardia

Minimal voltage criteria for LVH, may be normal variant

Borderline ECG

## 2019-01-25 NOTE — SHORT STAY SURGERY H&P
History of Present Illness


History of Present Illness


Chief Complaint


see recent office note


HPI


Jagjit Lovell is a 73 year old female who was admitted on  for Colon Polyps, 

Weight Loss





Patient History


Allergies:  


Coded Allergies:  


     No Known Allergies (Unverified , 1/16/19)





Medication History


Scheduled


Anastrozole* (Arimidex*), 1 MG PO DAILY, (Reported)


Atorvastatin Calcium* (Lipitor*), 10 MG ORAL BEDTIME, (Reported)


Celecoxib* (Celebrex*), 200 MG ORAL DAILY, (Reported)


Cholecalciferol (Vitamin D3)* (Vitamin D*), 5,000 UNIT ORAL DAILY, (Reported)


Metoprolol Succinate* (Toprol Xl*), 100 MG ORAL DAILY, (Reported)


Sertraline Hcl* (Zoloft*), 75 MG ORAL DAILY, (Reported)





Physical Exam


Vital Signs





Last Vital Signs








  Date Time  Temp Pulse Resp B/P (MAP) Pulse Ox O2 Delivery O2 Flow Rate FiO2


 


1/16/19 13:32  65 15  100   


 


1/16/19 13:05    117/60  Room Air  


 


1/16/19 12:35 98.0       


 


1/16/19 12:00       3 











Plan


Attestation


Are the patient's medical conditions optimized for surgery?











Atilio Albarran MD Jan 25, 2019 10:00

## 2019-01-25 NOTE — PRE-PROCEDURE NOTE/ATTESTATION
Pre-Procedure Note/Attestation


Complete Prior to Procedure


Planned Procedure:  not applicable


Procedure Narrative:


EGD and colonoscopy





Indications for Procedure


Pre-Operative Diagnosis:


abd pain, large colon polyp





Attestation


I attest that I discussed the nature of the procedure; its benefits; risks and 

complications; and alternatives (and the risks and benefits of such alternatives

), prior to the procedure, with the patient (or the patient's legal 

representative).





I attest that, if there was a reasonable possibility of needing a blood 

transfusion, the patient (or the patient's legal representative) was given the 

[facility] standardized written summary, pursuant 

to the [place] Blood Safety Act ([place] Health and Safety Code # 1645, as 

amended).





I attest that I re-evaluated the patient just prior to the surgery and that 

there has been no change in the patient's H&P, except as documented below:











Atilio Albarran MD Jan 25, 2019 09:59